# Patient Record
Sex: MALE | Race: WHITE | NOT HISPANIC OR LATINO | URBAN - NONMETROPOLITAN AREA
[De-identification: names, ages, dates, MRNs, and addresses within clinical notes are randomized per-mention and may not be internally consistent; named-entity substitution may affect disease eponyms.]

---

## 2021-08-17 ENCOUNTER — OFFICE VISIT (OUTPATIENT)
Dept: URBAN - NONMETROPOLITAN AREA CLINIC 2 | Facility: CLINIC | Age: 61
End: 2021-08-17
Payer: OTHER GOVERNMENT

## 2021-08-17 ENCOUNTER — WEB ENCOUNTER (OUTPATIENT)
Dept: URBAN - NONMETROPOLITAN AREA CLINIC 2 | Facility: CLINIC | Age: 61
End: 2021-08-17

## 2021-08-17 VITALS
SYSTOLIC BLOOD PRESSURE: 143 MMHG | HEIGHT: 74 IN | TEMPERATURE: 97.6 F | BODY MASS INDEX: 29.26 KG/M2 | HEART RATE: 84 BPM | DIASTOLIC BLOOD PRESSURE: 81 MMHG | WEIGHT: 228 LBS

## 2021-08-17 DIAGNOSIS — D68.59 PROTEIN S DEFICIENCY: ICD-10-CM

## 2021-08-17 DIAGNOSIS — Z12.11 ROUTINE COLON: ICD-10-CM

## 2021-08-17 DIAGNOSIS — J84.10 PULMONARY FIBROSIS: ICD-10-CM

## 2021-08-17 DIAGNOSIS — Z86.010 PERSONAL HISTORY OF COLONIC POLYPS: ICD-10-CM

## 2021-08-17 PROCEDURE — 99214 OFFICE O/P EST MOD 30 MIN: CPT | Performed by: INTERNAL MEDICINE

## 2021-08-17 RX ORDER — WARFARIN SODIUM 5 MG/1
TAKE 1 TABLET (5 MG) BY ORAL ROUTE ONCE DAILY TABLET ORAL 1
Qty: 0 | Refills: 0 | Status: ACTIVE | COMMUNITY
Start: 1900-01-01

## 2021-08-17 RX ORDER — FENOFIBRATE 160 MG/1
TAKE 1 TABLET (160 MG) BY ORAL ROUTE ONCE DAILY TABLET ORAL 1
Qty: 0 | Refills: 0 | Status: ON HOLD | COMMUNITY
Start: 1900-01-01

## 2021-08-17 RX ORDER — ATORVASTATIN CALCIUM 40 MG/1
TAKE 1 TABLET (40 MG) BY ORAL ROUTE ONCE DAILY TABLET, FILM COATED ORAL 1
Qty: 0 | Refills: 0 | Status: ACTIVE | COMMUNITY
Start: 1900-01-01

## 2021-08-17 RX ORDER — CARVEDILOL 12.5 MG/1
1 TABLET WITH FOOD TABLET, FILM COATED ORAL TWICE A DAY
Status: ACTIVE | COMMUNITY

## 2021-08-17 RX ORDER — NINTEDANIB 100 MG/1
1 CAPSULE CAPSULE ORAL
Status: ACTIVE | COMMUNITY

## 2021-08-17 RX ORDER — LOSARTAN POTASSIUM 50 MG/1
1 TABLET TABLET ORAL ONCE A DAY
Status: ACTIVE | COMMUNITY

## 2021-08-17 NOTE — HPI-TODAY'S VISIT:
Mr. Greenwood presents for follow-up of personal history of colon polyps.  His last colonoscopy was in 2016 with 1 TA by Dr. Prater.  Unfortunately he has had a complicated medical course, he has a history of pulmonary fibrosis and unfortunately had Covid earlier this year.  He is now on oxygen, and being worked up by the pulmonary transplant team at [facility].  He does have protein S deficiency on Coumadin followed by the Coumadin clinic.  He is also on Plavix as well.  He denies any rectal bleeding, he denies any recent anemia that he is aware of.  Today we had a long discussion regarding risks and benefits of the procedure.  At this point he agrees to hold off given the Covid surge, he would like to discuss surveillance colonoscopy in the spring.  We will consider this sooner if he is approved for transplant work-up and needs colonoscopy prior to this evaluation.  MB

## 2022-02-15 ENCOUNTER — OFFICE VISIT (OUTPATIENT)
Dept: URBAN - NONMETROPOLITAN AREA CLINIC 2 | Facility: CLINIC | Age: 62
End: 2022-02-15

## 2022-03-21 ENCOUNTER — LAB OUTSIDE AN ENCOUNTER (OUTPATIENT)
Dept: URBAN - NONMETROPOLITAN AREA CLINIC 2 | Facility: CLINIC | Age: 62
End: 2022-03-21

## 2022-03-21 ENCOUNTER — OFFICE VISIT (OUTPATIENT)
Dept: URBAN - NONMETROPOLITAN AREA CLINIC 2 | Facility: CLINIC | Age: 62
End: 2022-03-21
Payer: OTHER GOVERNMENT

## 2022-03-21 VITALS
WEIGHT: 226 LBS | DIASTOLIC BLOOD PRESSURE: 79 MMHG | SYSTOLIC BLOOD PRESSURE: 135 MMHG | BODY MASS INDEX: 29 KG/M2 | HEIGHT: 74 IN | HEART RATE: 75 BPM

## 2022-03-21 DIAGNOSIS — Z12.11 ROUTINE COLON: ICD-10-CM

## 2022-03-21 DIAGNOSIS — J84.10 PULMONARY FIBROSIS: ICD-10-CM

## 2022-03-21 DIAGNOSIS — Z86.010 PERSONAL HISTORY OF COLONIC POLYPS: ICD-10-CM

## 2022-03-21 DIAGNOSIS — D68.59 PROTEIN S DEFICIENCY: ICD-10-CM

## 2022-03-21 PROBLEM — 51615001: Status: ACTIVE | Noted: 2021-08-17

## 2022-03-21 PROBLEM — 1563006: Status: ACTIVE | Noted: 2021-08-17

## 2022-03-21 PROCEDURE — 99214 OFFICE O/P EST MOD 30 MIN: CPT | Performed by: NURSE PRACTITIONER

## 2022-03-21 RX ORDER — NINTEDANIB 100 MG/1
1 CAPSULE CAPSULE ORAL
Status: ACTIVE | COMMUNITY

## 2022-03-21 RX ORDER — CARVEDILOL 12.5 MG/1
1 TABLET WITH FOOD TABLET, FILM COATED ORAL TWICE A DAY
Status: ACTIVE | COMMUNITY

## 2022-03-21 RX ORDER — ATORVASTATIN CALCIUM 40 MG/1
TAKE 1 TABLET (40 MG) BY ORAL ROUTE ONCE DAILY TABLET, FILM COATED ORAL 1
Qty: 0 | Refills: 0 | Status: ACTIVE | COMMUNITY
Start: 1900-01-01

## 2022-03-21 RX ORDER — WARFARIN SODIUM 5 MG/1
TAKE 1 TABLET (5 MG) BY ORAL ROUTE ONCE DAILY TABLET ORAL 1
Qty: 0 | Refills: 0 | Status: ACTIVE | COMMUNITY
Start: 1900-01-01

## 2022-03-21 RX ORDER — LOSARTAN POTASSIUM 50 MG/1
1 TABLET TABLET ORAL ONCE A DAY
Status: ACTIVE | COMMUNITY

## 2022-03-21 RX ORDER — FENOFIBRATE 160 MG/1
TAKE 1 TABLET (160 MG) BY ORAL ROUTE ONCE DAILY TABLET ORAL 1
Qty: 0 | Refills: 0 | Status: ON HOLD | COMMUNITY
Start: 1900-01-01

## 2022-03-21 NOTE — HPI-TODAY'S VISIT:
Mr. Greenwood presents for follow-up of personal history of colon polyps.  His last colonoscopy was in 2016 with 1 TA by Dr. Prater.  Unfortunately he has had a complicated medical course, he has a history of pulmonary fibrosis and unfortunately had Covid earlier this year.  He is now on oxygen, and being worked up by the pulmonary transplant team at [facility].  He does have protein S deficiency on Coumadin followed by the Coumadin clinic.  He is also on Plavix as well.  He denies any rectal bleeding, he denies any recent anemia that he is aware of.  Today we had a long discussion regarding risks and benefits of the procedure.  At this point he agrees to hold off given the Covid surge, he would like to discuss surveillance colonoscopy in the spring.  We will consider this sooner if he is approved for transplant work-up and needs colonoscopy prior to this evaluation.  MB 3/21/2022 Mr. Cuadra presents for follow-up of personal history of colon polyps.  Since his last visit he did we with the [facility] transplant team for lung transplantation.  His pulmonary fibrosis has stabilized.  At this point he is still considering if he is going to pursue work-up.  He will need a colonoscopy prior to this.  His last colonoscopy was in 2016 with TA by Dr. Hightower.  His bowels are moving regularly.  He does have a history of protein S deficiency follow with Dr. Vivas with cardiology.  He prescribes his Coumadin and he will need a heparin bridge prior to this procedure.  He also follows with Dr. Paul Vivas with pulmonary.  He will also need pulmonary clearance for this procedure.  Today we have discussed risks and benefits.  At this point he is willing to pursue screening colonoscopy and possible preparation for lung transplant work-up.  MB

## 2022-03-24 ENCOUNTER — TELEPHONE ENCOUNTER (OUTPATIENT)
Dept: URBAN - NONMETROPOLITAN AREA CLINIC 1 | Facility: CLINIC | Age: 62
End: 2022-03-24

## 2022-06-03 ENCOUNTER — TELEPHONE ENCOUNTER (OUTPATIENT)
Dept: URBAN - NONMETROPOLITAN AREA CLINIC 2 | Facility: CLINIC | Age: 62
End: 2022-06-03

## 2022-06-03 RX ORDER — NINTEDANIB 100 MG/1
1 CAPSULE CAPSULE ORAL
Status: ACTIVE | COMMUNITY

## 2022-06-03 RX ORDER — CARVEDILOL 12.5 MG/1
1 TABLET WITH FOOD TABLET, FILM COATED ORAL TWICE A DAY
Status: ACTIVE | COMMUNITY

## 2022-06-03 RX ORDER — POLYETHYLENE GLYCOL 3350, SODIUM SULFATE, SODIUM CHLORIDE, POTASSIUM CHLORIDE, ASCORBIC ACID, SODIUM ASCORBATE 140-9-5.2G
AS DIRECTED KIT ORAL ONCE
Qty: 1 BOX | Refills: 0 | OUTPATIENT

## 2022-06-03 RX ORDER — FENOFIBRATE 160 MG/1
TAKE 1 TABLET (160 MG) BY ORAL ROUTE ONCE DAILY TABLET ORAL 1
Qty: 0 | Refills: 0 | Status: ON HOLD | COMMUNITY
Start: 1900-01-01

## 2022-06-03 RX ORDER — LOSARTAN POTASSIUM 50 MG/1
1 TABLET TABLET ORAL ONCE A DAY
Status: ACTIVE | COMMUNITY

## 2022-06-03 RX ORDER — ATORVASTATIN CALCIUM 40 MG/1
TAKE 1 TABLET (40 MG) BY ORAL ROUTE ONCE DAILY TABLET, FILM COATED ORAL 1
Qty: 0 | Refills: 0 | Status: ACTIVE | COMMUNITY
Start: 1900-01-01

## 2022-06-03 RX ORDER — WARFARIN SODIUM 5 MG/1
TAKE 1 TABLET (5 MG) BY ORAL ROUTE ONCE DAILY TABLET ORAL 1
Qty: 0 | Refills: 0 | Status: ACTIVE | COMMUNITY
Start: 1900-01-01

## 2022-06-06 ENCOUNTER — TELEPHONE ENCOUNTER (OUTPATIENT)
Dept: URBAN - METROPOLITAN AREA CLINIC 23 | Facility: CLINIC | Age: 62
End: 2022-06-06

## 2022-07-21 ENCOUNTER — OFFICE VISIT (OUTPATIENT)
Dept: URBAN - METROPOLITAN AREA MEDICAL CENTER 1 | Facility: MEDICAL CENTER | Age: 62
End: 2022-07-21

## 2022-08-04 ENCOUNTER — LAB OUTSIDE AN ENCOUNTER (OUTPATIENT)
Dept: URBAN - NONMETROPOLITAN AREA CLINIC 2 | Facility: CLINIC | Age: 62
End: 2022-08-04

## 2022-08-04 ENCOUNTER — OFFICE VISIT (OUTPATIENT)
Dept: URBAN - METROPOLITAN AREA MEDICAL CENTER 1 | Facility: MEDICAL CENTER | Age: 62
End: 2022-08-04
Payer: OTHER GOVERNMENT

## 2022-08-04 DIAGNOSIS — K63.89 BACTERIAL OVERGROWTH SYNDROME: ICD-10-CM

## 2022-08-04 DIAGNOSIS — Z12.11 COLON CANCER SCREENING: ICD-10-CM

## 2022-08-04 PROCEDURE — 45380 COLONOSCOPY AND BIOPSY: CPT | Performed by: INTERNAL MEDICINE

## 2022-09-27 ENCOUNTER — OFFICE VISIT (OUTPATIENT)
Dept: URBAN - NONMETROPOLITAN AREA CLINIC 2 | Facility: CLINIC | Age: 62
End: 2022-09-27

## 2022-09-29 ENCOUNTER — OFFICE VISIT (OUTPATIENT)
Dept: URBAN - NONMETROPOLITAN AREA CLINIC 2 | Facility: CLINIC | Age: 62
End: 2022-09-29

## 2023-01-05 ENCOUNTER — OFFICE VISIT (OUTPATIENT)
Dept: URBAN - NONMETROPOLITAN AREA CLINIC 2 | Facility: CLINIC | Age: 63
End: 2023-01-05

## 2023-01-27 LAB
AP CASE REPORT: (no result)
AP FINAL DIAGNOSIS: (no result)
AP GROSS DESCRIPTION: (no result)
AP MICROSCOPIC DESCRIPTION: (no result)

## 2023-06-08 ENCOUNTER — DASHBOARD ENCOUNTERS (OUTPATIENT)
Age: 63
End: 2023-06-08

## 2023-06-08 ENCOUNTER — OFFICE VISIT (OUTPATIENT)
Dept: URBAN - NONMETROPOLITAN AREA CLINIC 2 | Facility: CLINIC | Age: 63
End: 2023-06-08
Payer: OTHER GOVERNMENT

## 2023-06-08 VITALS
BODY MASS INDEX: 25.99 KG/M2 | HEART RATE: 76 BPM | HEIGHT: 74 IN | SYSTOLIC BLOOD PRESSURE: 92 MMHG | WEIGHT: 202.5 LBS | DIASTOLIC BLOOD PRESSURE: 62 MMHG

## 2023-06-08 DIAGNOSIS — R63.0 DECREASED APPETITE: ICD-10-CM

## 2023-06-08 DIAGNOSIS — J84.10 PULMONARY FIBROSIS: ICD-10-CM

## 2023-06-08 DIAGNOSIS — Z86.010 PERSONAL HISTORY OF COLONIC POLYPS: ICD-10-CM

## 2023-06-08 DIAGNOSIS — I50.9 HEART FAILURE, UNSPECIFIED HF CHRONICITY, UNSPECIFIED HEART FAILURE TYPE: ICD-10-CM

## 2023-06-08 DIAGNOSIS — D68.59 PROTEIN S DEFICIENCY: ICD-10-CM

## 2023-06-08 PROBLEM — 84114007: Status: ACTIVE | Noted: 2023-06-08

## 2023-06-08 PROBLEM — 428283002: Status: ACTIVE | Noted: 2021-08-17

## 2023-06-08 PROBLEM — 64379006: Status: ACTIVE | Noted: 2023-06-08

## 2023-06-08 PROCEDURE — 99214 OFFICE O/P EST MOD 30 MIN: CPT | Performed by: NURSE PRACTITIONER

## 2023-06-08 RX ORDER — ATORVASTATIN CALCIUM 40 MG/1
TAKE 1 TABLET (40 MG) BY ORAL ROUTE ONCE DAILY TABLET, FILM COATED ORAL 1
Qty: 0 | Refills: 0 | Status: ACTIVE | COMMUNITY
Start: 1900-01-01

## 2023-06-08 RX ORDER — POLYETHYLENE GLYCOL 3350, SODIUM SULFATE, SODIUM CHLORIDE, POTASSIUM CHLORIDE, ASCORBIC ACID, SODIUM ASCORBATE 140-9-5.2G
AS DIRECTED KIT ORAL ONCE
Qty: 1 BOX | Refills: 0 | Status: ACTIVE | COMMUNITY

## 2023-06-08 RX ORDER — WARFARIN SODIUM 5 MG/1
TAKE 1 TABLET (5 MG) BY ORAL ROUTE ONCE DAILY TABLET ORAL 1
Qty: 0 | Refills: 0 | Status: ACTIVE | COMMUNITY
Start: 1900-01-01

## 2023-06-08 RX ORDER — CARVEDILOL 12.5 MG/1
1 TABLET WITH FOOD TABLET, FILM COATED ORAL TWICE A DAY
Status: ACTIVE | COMMUNITY

## 2023-06-08 RX ORDER — NINTEDANIB 100 MG/1
1 CAPSULE CAPSULE ORAL
Status: ACTIVE | COMMUNITY

## 2023-06-08 RX ORDER — LOSARTAN POTASSIUM 50 MG/1
1 TABLET TABLET ORAL ONCE A DAY
Status: ACTIVE | COMMUNITY

## 2023-06-08 RX ORDER — FENOFIBRATE 160 MG/1
TAKE 1 TABLET (160 MG) BY ORAL ROUTE ONCE DAILY TABLET ORAL 1
Qty: 0 | Refills: 0 | Status: ON HOLD | COMMUNITY
Start: 1900-01-01

## 2023-06-08 NOTE — HPI-TODAY'S VISIT:
Mr. Greenwood presents for follow-up of personal history of colon polyps.  His last colonoscopy was in 2016 with 1 TA by Dr. Prater.  Unfortunately he has had a complicated medical course, he has a history of pulmonary fibrosis and unfortunately had Covid earlier this year.  He is now on oxygen, and being worked up by the pulmonary transplant team at [place].  He does have protein S deficiency on Coumadin followed by the Coumadin clinic.  He is also on Plavix as well.  He denies any rectal bleeding, he denies any recent anemia that he is aware of.  Today we had a long discussion regarding risks and benefits of the procedure.  At this point he agrees to hold off given the Covid surge, he would like to discuss surveillance colonoscopy in the spring.  We will consider this sooner if he is approved for transplant work-up and needs colonoscopy prior to this evaluation.  MB 3/21/2022 Mr. Cuadra presents for follow-up of personal history of colon polyps.  Since his last visit he did we with the [place] transplant team for lung transplantation.  His pulmonary fibrosis has stabilized.  At this point he is still considering if he is going to pursue work-up.  He will need a colonoscopy prior to this.  His last colonoscopy was in 2016 with TA by Dr. Hightower.  His bowels are moving regularly.  He does have a history of protein S deficiency follow with Dr. Vivas with cardiology.  He prescribes his Coumadin and he will need a heparin bridge prior to this procedure.  He also follows with Dr. Paul Vivas with pulmonary.  He will also need pulmonary clearance for this procedure.  Today we have discussed risks and benefits.  At this point he is willing to pursue screening colonoscopy and possible preparation for lung transplant work-up.  MB 6/8/2023 [place] presents for colonoscopy follow-up.  His colonoscopy was normal other than mild nodularity in the ascending colon, his biopsies were benign.  He does have diverticulosis.  His bowels are moving fairly well.  He was diagnosed with heart failure and has had a decreased appetite.  This has been persistent since he was diagnosed with COVID 2 years ago.  Today we have discussed appetite stimulants with likely component of gastroparesis plus or minus right-sided heart failure.  He wants to avoid these for now, he continues to eat small meals and use nutritional supplements.  He has lost some weight but this is maintaining.  He deferred the pulmonary work-up at [place], he does not want to pursue lung transplant.  Today he is doing fairly stable with multiple comorbid disease.  MB

## 2024-06-06 ENCOUNTER — OFFICE VISIT (OUTPATIENT)
Dept: URBAN - NONMETROPOLITAN AREA CLINIC 2 | Facility: CLINIC | Age: 64
End: 2024-06-06